# Patient Record
(demographics unavailable — no encounter records)

---

## 2024-12-09 NOTE — HISTORY OF PRESENT ILLNESS
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

## 2024-12-09 NOTE — ASSESSMENT
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 Panels contain 36 genes associated with varying levels of risk for familial lipid disorders. We reviewed the three possible results for each gene on this panel: positive, negative and variant of uncertain significance (VUS). We discussed that panel testing could result in incidental findings, such as identifying a positive result in a gene with cardiac risks not seen in the current personal or discussed family history. If the results are positive, we would discuss the risks and management options associated with that cardiac condition susceptibility gene and discuss genetic testing for family members. Pedigree was reviewed with the patient to identify those who should be tested if the patient is positive. If results are negative or a VUS is identified, the patient would continue to be managed based on personal and family history of their cardiac condition.

## 2024-12-09 NOTE — DISCUSSION/SUMMARY
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 Identifying genetic causes of his condition may change medical management for the patient and may affect the healthcare of other family members. We discussed genetic tests that we could perform to address the possible genetic causes of his personal and family history of hypercholesterolemia, including Comprehensive lipidemia panel offered through [facility]. We reviewed the risks, benefits, limitations, and implications of genetic testing.  Additionally, we examined the patients motivation for testing, and the emotional ramifications of the test results.  The patient is aware that results may impact family members.    After a review of testing options, the patient elected to undergo [facility] Comprehensive lipidemia panel containing 36 genes associated with varying levels of risk for FH. We reviewed the three possible results for each gene on this panel: positive, negative and variant of uncertain significance (VUS).  We discussed that panel testing could result in incidental findings, such as identifying a positive result in a gene with cardiac risks not seen in the current personal or discussed family history. If results are positive, we would discuss the risks and management options associated with that cardiac condition susceptibility gene and discuss genetic testing for family members.  Pedigree was reviewed with the patient to identify those who should be tested if the patient is positive.  If results are negative or a VUS is identified, the patient would continue to be managed based on personal and family history of their cardiac condition.   Parent expressed understanding of the presented information and satisfaction with having all of his questions and concerns addressed.

## 2024-12-09 NOTE — HISTORY OF PRESENT ILLNESS
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

## 2024-12-09 NOTE — REASON FOR VISIT
[FreeTextEntry3] :  Darvin Leonardo  is being seen for an initial consultation at the Cardiogenomics Program at [facility] on 12/09/2024. Darvin Leonardo was referred by Dr.Varinder Barnhart or hereditary cardiac predisposition risk assessment and counseling due to PMH of CAD and concern for FH.

## 2024-12-09 NOTE — PLAN
[TextEntry] : 1.	Informed Consent obtained for the Comprehensive Lipidemia Panel  sequencing and Del/Dup panel 36 genes (#Test code: 84859)   2.	Blood drawn today to be sent to [facility] for analysis, self pay  3.	 Mr. Darvin Leonardo  was provided an information sheet about his genetic testing.  4.	A follow-up appointment was scheduled in 6 weeks to discuss genetic testing results in person. Results generally return in 3-5 weeks.  For any additional questions please call  Joanne Vogel MS, EZ or Vitaly Agudelo MD, PhD at [phone] Time spent counseling the patient during the visit was 60 min. >50% time spent in care coordination /counseling for discussion of cardiac risk and appropriate management.   patient seen with Joanne Vogel MS, EZ for genetic counselling

## 2024-12-09 NOTE — DISCUSSION/SUMMARY
[TextEntry] : We reviewed the risks, benefits, limitations, and implications of genetic testing.  Additionally, we examined the patients motivation for testing, and the emotional ramifications of the test results.  The patient is aware that results may impact family members.  Counseling resources are available if requested.   RADHA, the Genetic Information Non-discrimination Act protects most people from discrimination in health insurance and employment at firms with over 50 employees.  RADHA does not protect against use of genetic information by life insurance, disability, or long-term care insurers.  Further information on other limitations is available at www.ScifinitiNAhelp.org.   After a review of testing options, the patient elected to the  [employer] Comprehensive Lipidemia Panel offered through [employer] . Panels contain 36  genes associated with varying levels of risk for familial lipid disorders. We reviewed the three possible results for each gene on this panel: positive, negative and variant of uncertain significance (VUS).  We discussed that panel testing could result in incidental findings, such as identifying a positive result in a gene with cardiac risks not seen in the current personal or discussed family history. If results are positive, we would discuss the  risks and management options associated with that cardiac condition susceptibility gene and discuss genetic testing for family members.  Pedigree was reviewed with the patient to identify those who should be tested if the patient is positive.  If results are negative or a VUS is identified, the patient would continue to be managed based on personal and family history of their  cardiac condition.

## 2024-12-09 NOTE — PLAN
[TextEntry] : 1. Informed consent for the [facility] Comprehensive lipidemia panel was obtained today (self-pay) 2. Blood draw will be performed in [facility] laboratory [facility], pending insurance authorization. 3. A follow-up appointment was scheduled in 2 months to discuss genetic testing results. The results are usually back in 4-6 weeks.   For any additional questions, please call Lela Goff, MS, EZ, Cardiogenomics Program, at [phone]  Joanne Vogel, MS, CGC Assistant Chief, Pediatric Cardiogenomics, [facility] Director Genetics, Program for Cardiac Genetics, Genomics and Precision Medicine  in the Departments of Pediatrics and Cardiology, [facility], [facility]

## 2024-12-09 NOTE — ASSESSMENT
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 The differences between hereditary and sporadic insert diagnosis were reviewed with the patient.  He  has elected to undergo genetic testing due to concerns for  personal history, definitive diagnosis, disease management, family history, concern for the health of family members . Results may change medical management for the patient and may affect the healthcare of other family members. He  expressed understanding of the presented information and satisfaction with having all of His questions and concerns addressed

## 2024-12-09 NOTE — SIGNATURES
[TextEntry] : Vitaly Agudelo MD, PhD  Medical Director Program for Cardiac Genetics, Genomics and Precision Medicine Department of Cardiology [facility]  [employer] at [address] [place] [address] Tel: [phone] Fax: [phone]  ([facility] office) [address], 3rd floor (between [address] and [address]) [place] [address] (p) [phone] (f) [phone]

## 2024-12-09 NOTE — FAMILY HISTORY
[FreeTextEntry1] : FamilyHistory_20_twCiteListControlStart FamilyHistory_20_twCiteListControlEnd [id number]  A four-generation family history was constructed and scanned into [employer].  Family history is significant for:  42 yo brother hx CAD s/p PCI  mother hx HLD  father dec car accident 68   his maternal families originate from  and paternal families originate from [facility]   No Ashkenazi Taoism ancestry.  Family history was negative for consanguinity   No family history of SIDS    [FreeTextEntry2] : [facility]  [FreeTextEntry3] : [facility]

## 2024-12-09 NOTE — PHYSICAL EXAM
[Extraocular Movements Intact] : extraocular movements were intact [Normal] : normal palmar creases without syndactyly or other anomalies [Tremor] : no tremor [de-identified] : + arcus, no xanthomas

## 2024-12-09 NOTE — REASON FOR VISIT
[FreeTextEntry3] : Dear Dr. James Barnhart       . I saw your patient Darvin Leonardo on 12/09/2024 . Please see the note below for the assessment and plan.   Darvin Leonardo  was seen  for an initial consultation at the Cardiogenomics Program at [facility] on 12/09/2024.   Mr. Leonardo was referred by  Dr. James Barnhart  for hereditary cardiac predisposition risk assessment and counseling, due to early CAD concern for FH

## 2024-12-09 NOTE — FAMILY HISTORY
[TextEntry] : Three generation family history was constructed and scanned into electronic medical records.   His maternal family history is positive for mother with HLD, aunt with hx of stroke, a cousin with leukemia and MGF with hs of MI. His paternal FHx is positive for cousin with HLD.    Darvin Leonardo   has two siblings. 50 yo brother (nl chol) 51 yo brother hx of stent placement   Children 14yo daughter (nl chol?)   The family history was negative for the presence of sudden death or other significant cardiac findings, known genetic disorders or consanguinity

## 2025-01-15 NOTE — HISTORY OF PRESENT ILLNESS
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

## 2025-01-15 NOTE — DISCUSSION/SUMMARY
[TextEntry] : Sequence analysis and deletion/duplication testing of the 36 genes listed in the Genes Analyzed section. [facility] Comprehensive Lipidemia Panel Add-on Preliminary-evidence Genes for Comprehensive Lipidemia RESULT: NEGATIVE    No known pathogenic variants were identified in any of the 36 genes evaluated   the limitations of genetic testing were discussed with Darvin Leonardo    for him  recommend continued medical care as per his cardiology team  for his family At this time we recommend all of his  first degree relatives undergo a clinical cardiac evaluation with history, physical exam, EKG and lipid levels. If their initial clinical evaluation is normal they should continued to have annual lipid levels and aggressive lipid treatment (for adults).

## 2025-01-15 NOTE — RESULTS/DATA
[TextEntry] : [facility] Comprehensive Lipidemia Panel- 36 genes Add-on Preliminary-evidence Genes for Comprehensive Lipidemia RESULT: NEGATIVE  This test did not identify any pathogenic variants known to cause disease.

## 2025-01-15 NOTE — SIGNATURES
[TextEntry] : Vitaly Agudelo MD, PhD  Medical Director Program for Cardiac Genetics, Genomics and Precision Medicine Department of Cardiology [facility]  [employer] at [address] [place] [address] Tel: [phone] Fax: [phone]  ([facility] office) [address], 3rd floor (between [address] and [address]) [place] [address] (p) [phone] (f) [phone]

## 2025-01-15 NOTE — REASON FOR VISIT
[Home] : at home, [unfilled] , at the time of the visit. [Medical Office: ([facility])___] : at the medical office located in  [FreeTextEntry3] : Dear Dr. James Barnhart       . I saw your patient Darvin Leonardo on 1/15/25 . Please see the note below for the assessment and plan.   Darvin Leonardo  was seen  for an initial consultation at the Cardiogenomics Program at [facility] on 12/09/2024.   Mr. Leonardo was referred by  Dr. Jmaes Barnhart  for hereditary cardiac predisposition risk assessment and counseling, due to early CAD concern for FH

## 2025-01-15 NOTE — ASSESSMENT
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 He underwent genetic testing. Results are negative.   No known pathogenic variants were identified in any of the 36 genes evaluated  the limitations of genetic testing were discussed with Darvin Leonardo    for him  recommend continued medical care as per his cardiology team  for his family At this time we recommend all of his  first degree relatives undergo a clinical cardiac evaluation with history, physical exam, EKG and lipid levels. If their initial clinical evaluation is normal they should continued to have annual lipid levels and aggressive lipid treatment (for adults).     Genetic knowledge changes rapidly.  We encourage re-contacting the cardiogenomics program at [facility] if there are significant changes in family or personal health, and annually. Darvin Leonardo expressed understanding of the presented information and satisfaction with having his questions and concerns addressed.

## 2025-01-15 NOTE — PLAN
[TextEntry] : 1. See above note for recommended management. 2. A copy of the genetic testing results and the note will be sent to the referring physician. 3. A copy of the result will be emailed to the patient.  4. Contact the Cardiogenomics program at [facility] to check on any changes in the availability of additional testing, or if there are changes in the personal or family cardiac history.  5. Long-term management and surveillance for the patient should be based on the patient's clinical treatment as recommended by his cardiologist.   Any changes in cardiac surveillance should be discussed with the patients physician.  I remain available should there be any new information for personal or family history.    For any additional questions please call Joanne Vogel MS, EZ, Cardiogenomic Program at [phone]  Joanne Vogel MS, EZ Assistant Chief, Pediatric Cardiogenomics, [facility] Director Genetics, Program for Cardiac Genetics, Genomics and Precision Medicine  in the Departments of Pediatrics and Cardiology, [facility], [facility]

## 2025-01-15 NOTE — PHYSICAL EXAM
[Extraocular Movements Intact] : extraocular movements were intact [Normal] : normal palmar creases without syndactyly or other anomalies [Tremor] : no tremor [de-identified] : + arcus, no xanthomas

## 2025-01-15 NOTE — ASSESSMENT
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 Patient underwent genetic testing and presents today for results.   [facility] Comprehensive Lipidemia Panel- 36 genes Add-on Preliminary-evidence Genes for Comprehensive Lipidemia RESULT: NEGATIVE  The limitations of genetic testing were discussed with the patient and his mother. This negative result does not exclude a genetic basis for patient's clinical features and/or family history. It is possible that he has a pathogenic variant that is not detectable by this analysis or is in a gene not evaluated by this test. As these results are not diagnostics, Mr Leonardo and at-risk relatives should continue receiving clinical evaluation and management based on medical and family history. At this time, we recommend all of his first-degree relatives undergo a clinical cardiac evaluation with history, physical exam, EKG, and lipid levels.  Genetic knowledge changes rapidly. Contact the Cardiogenomics program at [facility] to check on any changes in the availability of additional testing, or if there are changes in the personal or family cardiac history.   Patient expressed understanding of the presented information and satisfaction with having all of his questions and concerns addressed.

## 2025-01-15 NOTE — FAMILY HISTORY
[FreeTextEntry1] : FamilyHistory_20_twCiteListControlStart FamilyHistory_20_twCiteListControlEnd [id number]  A four-generation family history was constructed and scanned into [employer].  Family history is significant for:  40 yo brother hx CAD s/p PCI  mother hx HLD  father dec car accident 68   his maternal families originate from  and paternal families originate from [facility]   No Ashkenazi Synagogue ancestry.  Family history was negative for consanguinity   No family history of SIDS    [FreeTextEntry2] : [facility]  [FreeTextEntry3] : [facility]

## 2025-01-15 NOTE — CONSULT LETTER
[Dear  ___] : Dear ~ALPHONSO, [Consult Letter:] : I had the pleasure of evaluating your patient, [unfilled]. [Please see my note below.] : Please see my note below. [Consult Closing:] : Thank you very much for allowing me to participate in the care of this patient.  If you have any questions, please do not hesitate to contact me. [Sincerely,] : Sincerely, [FreeTextEntry3] : Vitaly Agudelo MD, PhD  Medical Director Program for Cardiac Genetics, Genomics and Precision Medicine Department of Cardiology [facility]  [employer] at [address] [place] [address] Tel: [phone] Fax: [phone]  ([facility] office) [address], 3rd floor (between [address] and [address]) [place] [address] (p) [phone] (f) [phone] [___] : [unfilled]

## 2025-01-15 NOTE — DISCUSSION/SUMMARY
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 Patient underwent genetic testing and presents today for results.   [facility] Comprehensive Lipidemia Panel- 36 genes Add-on Preliminary-evidence Genes for Comprehensive Lipidemia RESULT: NEGATIVE  The limitations of genetic testing were discussed with the patient and his mother. This negative result does not exclude a genetic basis for patient's clinical features and/or family history. It is possible that he has a pathogenic variant that is not detectable by this analysis or is in a gene not evaluated by this test.    We discussed that dyslipidemias could have a monogenic cause or may be associated with other conditions such as diabetes and thyroid disease, or other lifestyle factors. The clinical sensitivity of sequencing and deletion/duplication analysis of the genes included in the Dyslipidemia Panel depends in part on the individual's clinical phenotype, family history, and ethnic background.   As these results are not diagnostic, Mr Leonardo and at-risk relatives should continue receiving clinical evaluation and management based on medical and family history. At this time, we recommend all of his first-degree relatives undergo a clinical cardiac evaluation with history, physical exam, EKG, and lipid levels.  Patient expressed understanding of the presented information and satisfaction with having all of his questions and concerns addressed.

## 2025-01-15 NOTE — PLAN
[TextEntry] : See above note for recommended management. A copy of genetic testing results and clinic note will be sent to  the referring cardiologist   or physician We encourage sharing these results with family members,  Long-term management and surveillance for patient should be based on the patients clinical treatment as recommended by their cardiologist.   Any changes in cardiac surveillance should be discussed with the patients physician.  We remain available should there be any new information for personal or family history.  If there are any additional questions, please feel free to call the Cardiogenomics program at [facility], Joanne Vogel MS, EZ or Vitaly Agudelo MD, PhD at [phone] Time spent counseling the patient during the visit was 45 min. I spent 45 minutes on the encounter   Patient seen with Joanne Vogel MS, CGC, board certified genetic counsellor